# Patient Record
Sex: FEMALE | Race: ASIAN | URBAN - METROPOLITAN AREA
[De-identification: names, ages, dates, MRNs, and addresses within clinical notes are randomized per-mention and may not be internally consistent; named-entity substitution may affect disease eponyms.]

---

## 2022-12-06 ENCOUNTER — OFFICE VISIT (OUTPATIENT)
Dept: URBAN - METROPOLITAN AREA CLINIC 33 | Facility: CLINIC | Age: 22
End: 2022-12-06
Payer: COMMERCIAL

## 2022-12-06 DIAGNOSIS — H52.13 MYOPIA, BILATERAL: ICD-10-CM

## 2022-12-06 DIAGNOSIS — H04.123 DRY EYE SYNDROME OF BILATERAL LACRIMAL GLANDS: Primary | ICD-10-CM

## 2022-12-06 PROCEDURE — 92310 CONTACT LENS FITTING OU: CPT

## 2022-12-06 PROCEDURE — 92025 CPTRIZED CORNEAL TOPOGRAPHY: CPT

## 2022-12-06 PROCEDURE — 92004 COMPRE OPH EXAM NEW PT 1/>: CPT

## 2022-12-06 ASSESSMENT — VISUAL ACUITY
OD: 20/20
OS: 20/20

## 2022-12-06 ASSESSMENT — INTRAOCULAR PRESSURE
OD: 17
OS: 18

## 2022-12-06 ASSESSMENT — KERATOMETRY
OS: 44.13
OD: 43.50

## 2022-12-06 NOTE — IMPRESSION/PLAN
Impression: Myopia, bilateral: H52.13.
-good fit, vision, and comfort  with ultra OD, OS
-topography ordered showing spherical OD and mild steepening OS Plan: Patient educated on all refractive findings. SRx and CLRx were finalized and released to patient. Discussed adaptation period of at least 3 weeks of full time wear with patient. [place] and is unable to return for follow up visit. Discussed good contact lens hygiene with patient. Continue to monitor.